# Patient Record
Sex: MALE | Race: WHITE | NOT HISPANIC OR LATINO | Employment: OTHER | URBAN - METROPOLITAN AREA
[De-identification: names, ages, dates, MRNs, and addresses within clinical notes are randomized per-mention and may not be internally consistent; named-entity substitution may affect disease eponyms.]

---

## 2017-06-01 ENCOUNTER — HISTORICAL (OUTPATIENT)
Dept: RADIOLOGY | Facility: HOSPITAL | Age: 82
End: 2017-06-01

## 2018-01-30 ENCOUNTER — HISTORICAL (OUTPATIENT)
Dept: LAB | Facility: HOSPITAL | Age: 83
End: 2018-01-30

## 2018-01-30 LAB
CREAT UR-MCNC: 211 MG/DL
MICROALBUMIN UR-MCNC: 11.5 MG/DL
MICROALBUMIN/CREAT RATIO PNL UR: 54.5 MG/GM CR (ref 0–30)

## 2018-11-12 ENCOUNTER — HISTORICAL (OUTPATIENT)
Dept: LAB | Facility: HOSPITAL | Age: 83
End: 2018-11-12

## 2018-11-12 LAB
ABS NEUT (OLG): 4.53 X10(3)/MCL (ref 2.1–9.2)
BASOPHILS # BLD AUTO: 0 X10(3)/MCL (ref 0–0.2)
BASOPHILS NFR BLD AUTO: 0 %
CRP SERPL HS-MCNC: 0.65 MG/L (ref 0–3)
EOSINOPHIL # BLD AUTO: 0.2 X10(3)/MCL (ref 0–0.9)
EOSINOPHIL NFR BLD AUTO: 4 %
ERYTHROCYTE [DISTWIDTH] IN BLOOD BY AUTOMATED COUNT: 14.4 % (ref 11.5–17)
ERYTHROCYTE [SEDIMENTATION RATE] IN BLOOD: 6 MM/HR (ref 0–15)
HCT VFR BLD AUTO: 44.3 % (ref 42–52)
HGB BLD-MCNC: 14.1 GM/DL (ref 14–18)
LYMPHOCYTES # BLD AUTO: 1.3 X10(3)/MCL (ref 0.6–4.6)
LYMPHOCYTES NFR BLD AUTO: 19 %
MCH RBC QN AUTO: 31.6 PG (ref 27–31)
MCHC RBC AUTO-ENTMCNC: 31.8 GM/DL (ref 33–36)
MCV RBC AUTO: 99.3 FL (ref 80–94)
MONOCYTES # BLD AUTO: 0.6 X10(3)/MCL (ref 0.1–1.3)
MONOCYTES NFR BLD AUTO: 10 %
NEUTROPHILS # BLD AUTO: 4.53 X10(3)/MCL (ref 2.1–9.2)
NEUTROPHILS NFR BLD AUTO: 67 %
PLATELET # BLD AUTO: 152 X10(3)/MCL (ref 130–400)
PMV BLD AUTO: 10.5 FL (ref 9.4–12.4)
RBC # BLD AUTO: 4.46 X10(6)/MCL (ref 4.7–6.1)
TSH SERPL-ACNC: 0.6 MIU/L (ref 0.36–3.74)
WBC # SPEC AUTO: 6.8 X10(3)/MCL (ref 4.5–11.5)

## 2019-02-05 ENCOUNTER — HISTORICAL (OUTPATIENT)
Dept: LAB | Facility: HOSPITAL | Age: 84
End: 2019-02-05

## 2019-02-05 LAB
CREAT UR-MCNC: 221 MG/DL
MICROALBUMIN UR-MCNC: 8.1 MG/DL
MICROALBUMIN/CREAT RATIO PNL UR: 36.7 MG/GM CR (ref 0–30)

## 2019-08-09 ENCOUNTER — HISTORICAL (OUTPATIENT)
Dept: ADMINISTRATIVE | Facility: HOSPITAL | Age: 84
End: 2019-08-09

## 2019-08-09 LAB
ALBUMIN SERPL-MCNC: 4.2 GM/DL (ref 3.4–5)
ALP SERPL-CCNC: 82 UNIT/L (ref 45–117)
ALT SERPL-CCNC: 19 UNIT/L (ref 16–61)
AST SERPL-CCNC: 21 UNIT/L (ref 15–37)
BILIRUB SERPL-MCNC: 3.2 MG/DL (ref 0.2–1)
BILIRUBIN DIRECT+TOT PNL SERPL-MCNC: 0.6 MG/DL (ref 0–0.2)
BILIRUBIN DIRECT+TOT PNL SERPL-MCNC: 2.6 MG/DL (ref 0–1)
BUN SERPL-MCNC: 24 MG/DL (ref 7–18)
CALCIUM SERPL-MCNC: 9.6 MG/DL (ref 8.5–10.1)
CHLORIDE SERPL-SCNC: 102 MMOL/L (ref 98–107)
CO2 SERPL-SCNC: 36 MMOL/L (ref 21–32)
CREAT SERPL-MCNC: 1.65 MG/DL (ref 0.7–1.3)
CREAT/UREA NIT SERPL: 15
GLUCOSE SERPL-MCNC: 104 MG/DL (ref 74–106)
POTASSIUM SERPL-SCNC: 4 MMOL/L (ref 3.5–5.1)
PROT SERPL-MCNC: 7.5 GM/DL (ref 6.4–8.2)
SODIUM SERPL-SCNC: 142 MMOL/L (ref 136–145)

## 2019-09-05 ENCOUNTER — HISTORICAL (OUTPATIENT)
Dept: LAB | Facility: HOSPITAL | Age: 84
End: 2019-09-05

## 2019-09-05 LAB
ABS NEUT (OLG): 3.47 X10(3)/MCL (ref 2.1–9.2)
BASOPHILS # BLD AUTO: 0 X10(3)/MCL (ref 0–0.2)
BASOPHILS NFR BLD AUTO: 0 %
EOSINOPHIL # BLD AUTO: 0.2 X10(3)/MCL (ref 0–0.9)
EOSINOPHIL NFR BLD AUTO: 4 %
ERYTHROCYTE [DISTWIDTH] IN BLOOD BY AUTOMATED COUNT: 14 % (ref 11.5–17)
HCT VFR BLD AUTO: 46.2 % (ref 42–52)
HGB BLD-MCNC: 14.9 GM/DL (ref 14–18)
LYMPHOCYTES # BLD AUTO: 1.3 X10(3)/MCL (ref 0.6–4.6)
LYMPHOCYTES NFR BLD AUTO: 22 %
MCH RBC QN AUTO: 31.1 PG (ref 27–31)
MCHC RBC AUTO-ENTMCNC: 32.3 GM/DL (ref 33–36)
MCV RBC AUTO: 96.5 FL (ref 80–94)
MONOCYTES # BLD AUTO: 0.6 X10(3)/MCL (ref 0.1–1.3)
MONOCYTES NFR BLD AUTO: 10 %
NEUTROPHILS # BLD AUTO: 3.47 X10(3)/MCL (ref 2.1–9.2)
NEUTROPHILS NFR BLD AUTO: 62 %
PLATELET # BLD AUTO: 151 X10(3)/MCL (ref 130–400)
PMV BLD AUTO: 10.9 FL (ref 9.4–12.4)
RBC # BLD AUTO: 4.79 X10(6)/MCL (ref 4.7–6.1)
WBC # SPEC AUTO: 5.6 X10(3)/MCL (ref 4.5–11.5)

## 2020-01-20 ENCOUNTER — HISTORICAL (OUTPATIENT)
Dept: ADMINISTRATIVE | Facility: HOSPITAL | Age: 85
End: 2020-01-20

## 2020-02-06 ENCOUNTER — HISTORICAL (OUTPATIENT)
Dept: LAB | Facility: HOSPITAL | Age: 85
End: 2020-02-06

## 2020-02-06 LAB
(HCYS)2 SERPL-MCNC: 18.4 MCMOL/L (ref 3.2–10.7)
ABS NEUT (OLG): 5.6 X10(3)/MCL (ref 2.1–9.2)
BASOPHILS # BLD AUTO: 0 X10(3)/MCL (ref 0–0.2)
BASOPHILS NFR BLD AUTO: 0 %
CRP SERPL HS-MCNC: 2.81 MG/L (ref 0–3)
EOSINOPHIL # BLD AUTO: 0.2 X10(3)/MCL (ref 0–0.9)
EOSINOPHIL NFR BLD AUTO: 2 %
ERYTHROCYTE [DISTWIDTH] IN BLOOD BY AUTOMATED COUNT: 13.9 % (ref 11.5–17)
HCT VFR BLD AUTO: 44.7 % (ref 42–52)
HGB BLD-MCNC: 14.6 GM/DL (ref 14–18)
LYMPHOCYTES # BLD AUTO: 1.7 X10(3)/MCL (ref 0.6–4.6)
LYMPHOCYTES NFR BLD AUTO: 20 %
MCH RBC QN AUTO: 31.6 PG (ref 27–31)
MCHC RBC AUTO-ENTMCNC: 32.7 GM/DL (ref 33–36)
MCV RBC AUTO: 96.8 FL (ref 80–94)
MONOCYTES # BLD AUTO: 0.8 X10(3)/MCL (ref 0.1–1.3)
MONOCYTES NFR BLD AUTO: 10 %
NEUTROPHILS # BLD AUTO: 5.6 X10(3)/MCL (ref 2.1–9.2)
NEUTROPHILS NFR BLD AUTO: 67 %
PLATELET # BLD AUTO: 252 X10(3)/MCL (ref 130–400)
PMV BLD AUTO: 10 FL (ref 9.4–12.4)
RBC # BLD AUTO: 4.62 X10(6)/MCL (ref 4.7–6.1)
WBC # SPEC AUTO: 8.4 X10(3)/MCL (ref 4.5–11.5)

## 2020-03-04 ENCOUNTER — HISTORICAL (OUTPATIENT)
Dept: LAB | Facility: HOSPITAL | Age: 85
End: 2020-03-04

## 2020-03-04 LAB
CREAT UR-MCNC: 281 MG/DL
MICROALBUMIN UR-MCNC: 10.8 MG/DL
MICROALBUMIN/CREAT RATIO PNL UR: 38.4 MG/GM CR (ref 0–30)

## 2020-07-13 ENCOUNTER — HISTORICAL (OUTPATIENT)
Dept: ADMINISTRATIVE | Facility: HOSPITAL | Age: 85
End: 2020-07-13

## 2020-07-13 LAB
ABS NEUT (OLG): 4.76 X10(3)/MCL (ref 2.1–9.2)
BASOPHILS # BLD AUTO: 0 X10(3)/MCL (ref 0–0.2)
BASOPHILS NFR BLD AUTO: 0 %
EOSINOPHIL # BLD AUTO: 0.3 X10(3)/MCL (ref 0–0.9)
EOSINOPHIL NFR BLD AUTO: 5 %
ERYTHROCYTE [DISTWIDTH] IN BLOOD BY AUTOMATED COUNT: 13.3 % (ref 11.5–17)
HCT VFR BLD AUTO: 41.9 % (ref 42–52)
HGB BLD-MCNC: 13.9 GM/DL (ref 14–18)
LYMPHOCYTES # BLD AUTO: 1.1 X10(3)/MCL (ref 0.6–4.6)
LYMPHOCYTES NFR BLD AUTO: 16 %
MCH RBC QN AUTO: 32.1 PG (ref 27–31)
MCHC RBC AUTO-ENTMCNC: 33.2 GM/DL (ref 33–36)
MCV RBC AUTO: 96.8 FL (ref 80–94)
MONOCYTES # BLD AUTO: 0.6 X10(3)/MCL (ref 0.1–1.3)
MONOCYTES NFR BLD AUTO: 9 %
NEUTROPHILS # BLD AUTO: 4.76 X10(3)/MCL (ref 2.1–9.2)
NEUTROPHILS NFR BLD AUTO: 69 %
PLATELET # BLD AUTO: 162 X10(3)/MCL (ref 130–400)
PMV BLD AUTO: 9.9 FL (ref 9.4–12.4)
RBC # BLD AUTO: 4.33 X10(6)/MCL (ref 4.7–6.1)
WBC # SPEC AUTO: 6.9 X10(3)/MCL (ref 4.5–11.5)

## 2020-12-30 ENCOUNTER — HISTORICAL (OUTPATIENT)
Dept: ENDOSCOPY | Facility: HOSPITAL | Age: 85
End: 2020-12-30

## 2021-11-28 ENCOUNTER — HISTORICAL (OUTPATIENT)
Dept: LAB | Facility: HOSPITAL | Age: 86
End: 2021-11-28

## 2021-11-28 LAB
BUN SERPL-MCNC: 19 MG/DL (ref 8.4–25.7)
CALCIUM SERPL-MCNC: 10.4 MG/DL (ref 8.8–10)
CHLORIDE SERPL-SCNC: 99 MMOL/L (ref 98–107)
CO2 SERPL-SCNC: 30 MMOL/L (ref 23–31)
CREAT SERPL-MCNC: 1.18 MG/DL (ref 0.72–1.25)
CREAT/UREA NIT SERPL: 16
GLUCOSE SERPL-MCNC: 200 MG/DL (ref 82–115)
POTASSIUM SERPL-SCNC: 4.5 MMOL/L (ref 3.5–5.1)
SODIUM SERPL-SCNC: 142 MMOL/L (ref 136–145)

## 2021-11-29 ENCOUNTER — HISTORICAL (OUTPATIENT)
Dept: LAB | Facility: HOSPITAL | Age: 86
End: 2021-11-29

## 2021-11-29 LAB
BUN SERPL-MCNC: 19.1 MG/DL (ref 8.4–25.7)
CALCIUM SERPL-MCNC: 9.8 MG/DL (ref 8.8–10)
CHLORIDE SERPL-SCNC: 102 MMOL/L (ref 98–107)
CO2 SERPL-SCNC: 30 MMOL/L (ref 23–31)
CREAT SERPL-MCNC: 1.14 MG/DL (ref 0.72–1.25)
CREAT/UREA NIT SERPL: 17
GLUCOSE SERPL-MCNC: 229 MG/DL (ref 82–115)
POTASSIUM SERPL-SCNC: 4.4 MMOL/L (ref 3.5–5.1)
SODIUM SERPL-SCNC: 142 MMOL/L (ref 136–145)

## 2022-04-10 ENCOUNTER — HISTORICAL (OUTPATIENT)
Dept: ADMINISTRATIVE | Facility: HOSPITAL | Age: 87
End: 2022-04-10

## 2022-04-26 VITALS
DIASTOLIC BLOOD PRESSURE: 81 MMHG | HEIGHT: 72 IN | OXYGEN SATURATION: 99 % | BODY MASS INDEX: 23.56 KG/M2 | SYSTOLIC BLOOD PRESSURE: 134 MMHG | WEIGHT: 173.94 LBS

## 2022-04-30 NOTE — OP NOTE
DATE OF SURGERY:        SURGEON:  Chauncey Waters MD    HISTORY OF PRESENT ILLNESS:  Mr. Bhat is an 87-year-old, known to me from previous evaluation.  He has a remote history of colitis and adenomatous colon polyps, and his last exam in that regard took place in February of 2013.     He has had problematic solid food dysphagia, without significant pyrosis, and in August of 2017, we found a tortuous esophagus with a wide-mouth diverticulum at 35 cm and a relative stenosis at the esophagogastric junction.  We used a TTS balloon to dilate to 18 mm, and he garnered significant improvement.  He re-presented in December of 2018 with similar concerns, and again underwent endoscopy with TTS dilation to 18 mm.  He did find relief.  I had proposed that should he have another need, we may consider a Savary dilation, hopeful for more long-standing benefit.     The patient has had progressive dementia.  He does take Eliquis for atrial fibrillation, but it has been held in recent days.     [facility], though I note in September of 2020, his only liver test abnormalities were a total bilirubin of 3.1 with a normal alkaline phosphatase of 60 and normal transaminases.  His concerns seemed related to Gilbert's.  He had undergone a CT of the abdomen and pelvis in October of 2020 that found chronic-appearing gallbladder wall thickening with adherent gallstones or polyps, but no ductal dilatation.  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     I had a lengthy discussion with the wife today, and given his years and progressive mental decline, she is not inclined to pursue some exhaustive evaluation.  I concur.    ALLERGIES:  None.    MEDICATIONS:  Listed as:  1. Apixaban.  2. Chlorthalidone.  3. Metformin.  4. Insulin.  5. Rosuvastatin.  6. Aspirin.  7. Carvedilol.    8. He has held the apixaban (Eliquis) in recent days.    PAST MEDICAL HISTORY:  Remarkable for diabetes  mellitus, hyperlipidemia, hypertension, remote history of myocardial infarction.  He apparently has had sleep apnea, cataract surgery, appendectomy, pacemaker placement, cholecystectomy, hemorrhoidectomy, prostate surgery.  He does have the Alzheimer's.    PHYSICAL EXAMINATION:  GENERAL:  He is alert, but verbalizes very little.     HEART:  Seems regular.   LUNGS:  Clear.   ABDOMEN:  Scaphoid, soft, and nontender, without organomegaly or mass.   NEUROLOGICAL:  Alert.  He is not clearly oriented.  He does move all four extremities.    DISCUSSION:  Elderly patient with recurrent dysphagia.  The patient will undergo endoscopy and dilation with further recommendations to follow.  Given his age and comorbidities, we will likely not pursue other investigation.        ______________________________  Chauncey Waters MD    JCB/UF  DD:  12/30/2020  Time:  10:13AM  DT:  12/30/2020  Time:  10:32AM  Job #:  [id number]    cc: MD Chauncey Licona MD Karen Smith, MD

## 2022-04-30 NOTE — OP NOTE
DATE OF SURGERY:    12/30/2020    SURGEON:  Chauncey Waters MD    OPERATION:  Esophagogastroduodenoscopy and wire dilation.    PREOPERATIVE DIAGNOSES:    1. Recurrent solid food dysphagia.  2. History of esophageal diverticula.    POSTOPERATIVE DIAGNOSES:    1. Adequate sedation with Diprivan per Anesthesia.  2. Very tortuous esophagus with a clear-cut wide-mouth diverticulum at the mid esophagus around 35 cm with some relative stenosis evident there and at the esophagogastric junction.  3. No evidence for hiatal hernia.  Otherwise, benign exam to the second portion of the duodenum other than for a very mild bulbar duodenitis as previously noted.  4. Status post Savary dilation initially with a 51-Albanian, followed by repeat endoscopy and then subsequently with a 54-Albanian noting mild resistance.    PROCEDURE IN DETAIL:  The patient consented for the procedure after a detailed explanation of the risks and complications, including bleeding and perforation, as well as adverse reaction to sedation.     The patient was placed in the left lateral decubitus position.  A video endoscope was inserted in the oropharynx and passed under direct vision.  The hypopharynx and larynx were briefly examined and the esophagus entered.  After evaluation of the upper, mid and lower esophagus, the scope was passed into the stomach and retroflexed.  The cardia and fundus were evaluated.  The scope was de-retroflexed and we evaluated the body and antrum.     We traversed the pylorus, evaluating the first and second portions of the duodenum.  The scope was withdrawn and biopsies and specimens obtained as outlined below.  The scope was removed in its entirety and the patient tolerated the procedure well.     The patient's findings are as detailed.  He did have a tortuous esophagus with clear-cut wide-mouth diverticula in the mid region.  There was some questionable stenosis at various levels including at the esophagogastric  junction.  There was no visible hiatal hernia.  He had mild bulbar duodenitis, but an otherwise reassuring exam to the second portion.     We inserted a Savary wire, maintained it in position and dilated him with a 51-French Savary.  I then reinserted the scope to ensure no mucosal injury.  We subsequently reinserted a Savary wire and then dilated him with a 54-French Savary, again with mild resistance.    DISCUSSION:  After his procedure was completed, we discussed our findings with the patient and his wife.  I am hopeful that today's dilation brings him relief for the long term.  Again, I am not inclined to subject him to more in the way of investigation regarding his pancreatic cyst and the apparent Gilbert's syndrome.     We will be available on an as-needed basis down the line.  We will let him resume his anticoagulation.        ______________________________  MD TAMRA Ruiz/SC  DD:  12/30/2020  Time:  10:30AM  DT:  12/30/2020  Time:  10:41AM  Job #:  [id number]    cc: MD Dr. Shannan Ruiz Dr.

## 2022-09-22 ENCOUNTER — LAB REQUISITION (OUTPATIENT)
Dept: LAB | Facility: HOSPITAL | Age: 87
End: 2022-09-22
Payer: MEDICARE

## 2022-09-22 DIAGNOSIS — R32 UNSPECIFIED URINARY INCONTINENCE: ICD-10-CM

## 2022-09-22 DIAGNOSIS — N39.0 URINARY TRACT INFECTION, SITE NOT SPECIFIED: ICD-10-CM

## 2022-09-22 LAB
APPEARANCE UR: ABNORMAL
BACTERIA #/AREA URNS AUTO: ABNORMAL /HPF
BILIRUB UR QL STRIP.AUTO: NEGATIVE MG/DL
COLOR UR AUTO: ABNORMAL
GLUCOSE UR QL STRIP.AUTO: 3 MG/DL
KETONES UR QL STRIP.AUTO: NEGATIVE MG/DL
LEUKOCYTE ESTERASE UR QL STRIP.AUTO: 2 UNIT/L
NITRITE UR QL STRIP.AUTO: NEGATIVE
PH UR STRIP.AUTO: 6 [PH]
PROT UR QL STRIP.AUTO: 1 MG/DL
RBC #/AREA URNS AUTO: ABNORMAL /HPF
RBC UR QL AUTO: 2 UNIT/L
SP GR UR STRIP.AUTO: <1.005 (ref 1–1.03)
SQUAMOUS #/AREA URNS AUTO: ABNORMAL /HPF
UROBILINOGEN UR STRIP-ACNC: 1 MG/DL
WBC #/AREA URNS AUTO: >100 /HPF

## 2022-09-22 PROCEDURE — 87077 CULTURE AEROBIC IDENTIFY: CPT | Performed by: INTERNAL MEDICINE

## 2022-09-22 PROCEDURE — 81001 URINALYSIS AUTO W/SCOPE: CPT | Performed by: INTERNAL MEDICINE

## 2022-09-24 LAB — BACTERIA UR CULT: ABNORMAL

## 2022-10-25 ENCOUNTER — HOSPITAL ENCOUNTER (EMERGENCY)
Facility: HOSPITAL | Age: 87
Discharge: HOME | End: 2022-10-26
Attending: EMERGENCY MEDICINE
Payer: MEDICARE

## 2022-10-25 DIAGNOSIS — T14.90XA TRAUMA: ICD-10-CM

## 2022-10-25 DIAGNOSIS — S01.01XA LACERATION OF SCALP, INITIAL ENCOUNTER: ICD-10-CM

## 2022-10-25 DIAGNOSIS — I10 BENIGN ESSENTIAL HYPERTENSION: ICD-10-CM

## 2022-10-25 DIAGNOSIS — Z79.01 CHRONIC ANTICOAGULATION: ICD-10-CM
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
## 2022-10-25 PROCEDURE — G0390 TRAUMA RESPONS W/HOSP CRITI: HCPCS

## 2022-10-25 PROCEDURE — 99285 EMERGENCY DEPT VISIT HI MDM: CPT | Mod: 25

## 2022-10-26 VITALS
DIASTOLIC BLOOD PRESSURE: 71 MMHG | BODY MASS INDEX: 20.99 KG/M2 | RESPIRATION RATE: 16 BRPM | SYSTOLIC BLOOD PRESSURE: 144 MMHG | TEMPERATURE: 99 F | WEIGHT: 155 LBS | OXYGEN SATURATION: 100 % | HEIGHT: 72 IN | HEART RATE: 60 BPM

## 2022-10-26 PROCEDURE — 25000003 PHARM REV CODE 250: Performed by: EMERGENCY MEDICINE

## 2022-10-26 PROCEDURE — 12002 RPR S/N/AX/GEN/TRNK2.6-7.5CM: CPT

## 2022-10-26 PROCEDURE — 36000 PLACE NEEDLE IN VEIN: CPT

## 2022-10-26 PROCEDURE — 63600175 PHARM REV CODE 636 W HCPCS: Performed by: EMERGENCY MEDICINE

## 2022-10-26 PROCEDURE — 90471 IMMUNIZATION ADMIN: CPT | Performed by: EMERGENCY MEDICINE

## 2022-10-26 PROCEDURE — 90715 TDAP VACCINE 7 YRS/> IM: CPT | Performed by: EMERGENCY MEDICINE

## 2022-10-26 RX ORDER — ACETAMINOPHEN 500 MG
1000 TABLET ORAL
Status: COMPLETED | OUTPATIENT
Start: 2022-10-26 | End: 2022-10-26

## 2022-10-26 RX ADMIN — ACETAMINOPHEN 1000 MG: 500 TABLET ORAL at 01:10

## 2022-10-26 RX ADMIN — TETANUS TOXOID, REDUCED DIPHTHERIA TOXOID AND ACELLULAR PERTUSSIS VACCINE, ADSORBED 0.5 ML: 5; 2.5; 8; 8; 2.5 SUSPENSION INTRAMUSCULAR at 12:10

## 2022-10-26 NOTE — DISCHARGE INSTRUCTIONS
You will need your staples removed in about 5 days. This can be done at a walk in clinic, your primary care provider, or any ER. Keep the wound clean and dry for the first 24 hours. After the first 24 hours you can clean it with soap and water or shampoo. Do not use hydrogen peroxide

## 2022-10-26 NOTE — ED PROVIDER NOTES
Encounter Date: 10/25/2022    SCRIBE #1 NOTE: I, Eugenio Terrell, am scribing for, and in the presence of,  Heather Hua MD. I have scribed the following portions of the note - Other sections scribed: HPI, ROS, PE.     History   No chief complaint on file.    89 year old male with past medical history of HTN, DM, and dementia presents to ED via EMS for witnessed fall at home just prior to arrival. Per EMS pt was getting out of bed and fell, hitting his head on a nightstand. EMS denies LOC. EMS reports that caregivers state that pt was at his mental baseline following fall. EMS reports pt is on 2.5 of eliquis. Pt reports that his head hurts.    The history is provided by the EMS personnel. No  was used.   Fall  The accident occurred just prior to arrival. The fall occurred from a bed (from ground level). He fell from a height of 1 to 2 ft. He landed on A hard floor (nightstand). The point of impact was the head. The pain is present in the head. Associated symptoms include headaches. Pertinent negatives include no fever, no numbness, no abdominal pain and no loss of consciousness.   Review of patient's allergies indicates:  Not on File  Past Medical History:   Diagnosis Date    Dementia     Type 2 diabetes mellitus without complications      History reviewed. No pertinent surgical history.  History reviewed. No pertinent family history.  Social History     Tobacco Use    Smoking status: Never    Smokeless tobacco: Never   Substance Use Topics    Alcohol use: Never    Drug use: Never     Review of Systems   Constitutional:  Negative for activity change, diaphoresis, fatigue and fever.   HENT:  Negative for congestion, postnasal drip, rhinorrhea, sinus pain, sneezing and sore throat.         Head pain   Respiratory:  Negative for cough, chest tightness, shortness of breath and wheezing.    Cardiovascular:  Negative for chest pain, palpitations and leg swelling.   Gastrointestinal:  Negative  for abdominal distention, abdominal pain and blood in stool.   Genitourinary:  Negative for decreased urine volume, difficulty urinating and dysuria.   Musculoskeletal: Negative.    Skin:  Positive for wound. Negative for color change and pallor.   Neurological:  Positive for headaches. Negative for dizziness, loss of consciousness, speech difficulty, weakness, light-headedness and numbness.   All other systems reviewed and are negative.    Physical Exam     Initial Vitals   BP Pulse Resp Temp SpO2   10/26/22 0014 10/26/22 0014 10/26/22 0014 10/26/22 0014 10/25/22 2358   (!) 132/91 60 12 98.4 °F (36.9 °C) 96 %      MAP       --                Physical Exam    Nursing note and vitals reviewed.  Constitutional: He appears well-developed and well-nourished. He is not diaphoretic. No distress.   HENT:   Head: Normocephalic.   Nose: Nose normal.   Mouth/Throat: Oropharynx is clear and moist.   Eyes: Conjunctivae and EOM are normal. Pupils are equal, round, and reactive to light.   Pupils 3 mm, reactive   Neck: Trachea normal. Neck supple.   Normal range of motion.  Cardiovascular:  Normal rate, regular rhythm, normal heart sounds and intact distal pulses.     Exam reveals no gallop and no friction rub.       No murmur heard.  Pulses:       Radial pulses are 2+ on the right side and 2+ on the left side.        Dorsalis pedis pulses are 2+ on the right side and 2+ on the left side.   Pulmonary/Chest: Breath sounds normal. No respiratory distress. He has no wheezes. He has no rhonchi. He has no rales. He exhibits no tenderness.   Bilateral breath sounds   Abdominal: Abdomen is soft. Bowel sounds are normal. He exhibits no distension and no mass. There is no abdominal tenderness. There is no rebound.   Musculoskeletal:         General: No tenderness or edema. Normal range of motion.      Cervical back: Normal range of motion and neck supple. No tenderness.      Thoracic back: No tenderness.      Lumbar back: Normal. No  tenderness. Normal range of motion.      Comments: No step offs to spine     Neurological: He is alert. He has normal strength. No cranial nerve deficit or sensory deficit. GCS eye subscore is 4. GCS verbal subscore is 4. GCS motor subscore is 6.   GCS 14   Skin: Skin is warm and dry. Capillary refill takes less than 2 seconds. Abrasion (right elbow) and laceration (right frontal scalp) noted. No rash and no abscess noted. No erythema. No pallor.   3 cm frontal scalp lac   Psychiatric: He has a normal mood and affect. His behavior is normal. Judgment and thought content normal.       ED Course   Lac Repair    Date/Time: 10/26/2022 1:10 AM  Performed by: Heather Hua MD  Authorized by: Heahter Hua MD     Consent:     Consent obtained:  Verbal    Consent given by:  Patient    Risks discussed:  Pain, poor cosmetic result, poor wound healing, need for additional repair and infection    Alternatives discussed:  No treatment and delayed treatment  Universal protocol:     Procedure explained and questions answered to patient or proxy's satisfaction: yes      Patient identity confirmed:  Arm band  Anesthesia:     Anesthesia method:  Local infiltration    Local anesthetic:  Lidocaine 1% WITH epi  Laceration details:     Location:  Scalp    Scalp location:  Frontal    Length (cm):  3    Depth (mm):  1  Pre-procedure details:     Preparation:  Imaging obtained to evaluate for foreign bodies  Exploration:     Limited defect created (wound extended): no      Hemostasis achieved with:  Epinephrine    Imaging outcome: foreign body not noted      Wound exploration: wound explored through full range of motion and entire depth of wound visualized      Wound extent: no areolar tissue violation noted, no fascia violation noted, no foreign bodies/material noted, no muscle damage noted, no nerve damage noted, no tendon damage noted, no underlying fracture noted and no vascular damage noted      Contaminated: no    Treatment:      Amount of cleaning:  Extensive    Irrigation solution:  Sterile saline    Irrigation method:  Pressure wash    Visualized foreign bodies/material removed: no      Debridement:  None    Undermining:  None    Scar revision: no    Skin repair:     Repair method:  Staples    Number of staples:  7  Approximation:     Approximation:  Close  Repair type:     Repair type:  Simple  Post-procedure details:     Dressing:  Open (no dressing)    Procedure completion:  Tolerated well, no immediate complications  Labs Reviewed - No data to display       Imaging Results              X-Ray Pelvis Routine AP (Preliminary result)  Result time 10/26/22 00:51:33      ED Interpretation by Heather Hua MD (10/26/22 00:51:33, [facility] - Emergency Dept, Emergency Medicine)    No acute fracture or dislocation noted                                     X-Ray Chest AP Portable (Preliminary result)  Result time 10/26/22 00:52:07      ED Interpretation by Heather Hua MD (10/26/22 00:52:07, [facility] - Emergency Dept, Emergency Medicine)    Aicd in place, no acute cardiopulmonary abnormality noted                                     CT Cervical Spine Without Contrast (Preliminary result)  Result time 10/26/22 00:30:34      Preliminary result by Vic June Jr., MD (10/26/22 00:30:34)                   Narrative:    START OF REPORT:  Technique: CT of the cervical spine was performed without intravenous contrast with axial as well as sagittal and coronal images.    Comparison: None.    Dosage Information: Automated exposure control was utilized.    Clinical history: Fall, lac rt parietal.    Findings:  Position: Supine.  Artifact: None.  Lung apices: The visualized lung apices appear unremarkable.  Spine:  Spinal canal: The spinal canal appears unremarkable.  Spinal cord: The spinal cord appears unremarkable.  Mineralization: Within normal limits.  Rotation: No significant rotation is  seen.  Scoliosis: No significant scoliosis is seen.  Vertebral Fusion: No vertebral fusion is identified.  Listhesis: No significant listhesis is identified.  Lordosis: The cervical lordosis is maintained.  Intervertebral disc spaces: Multilevel loss of disc height is seen.  Osteophytes: Pronounced multilevel endplate osteophytes are seen. Large anterior bridging osteophytes are seen.  Endplate Sclerosis: Moderate multilevel endplate sclerosis is seen.  Uncovertebral degenerative changes: Moderate multilevel uncovertebral joint arthrosis is seen.  Facet degenerative changes: Mild multilevel facet degenerative changes are seen.  Calcifications: None.  Fractures: No acute cervical spine fracture dislocation or subluxation is seen.  Orthopedic Hardware: None.    Miscellaneous:  Mastoid air cells: The visualized mastoid air cells appear clear.  Soft Tissues: Unremarkable.      Impression:  1. No acute cervical spine fracture dislocation or subluxation is seen.  2. Degenerative changes and other details as above. Details and other findings as noted above.                          Preliminary result by Interface, Rad Results In (10/26/22 00:30:34)                   Narrative:    START OF REPORT:  Technique: CT of the cervical spine was performed without intravenous contrast with axial as well as sagittal and coronal images.    Comparison: None.    Dosage Information: Automated exposure control was utilized.    Clinical history: Fall, lac rt parietal.    Findings:  Position: Supine.  Artifact: None.  Lung apices: The visualized lung apices appear unremarkable.  Spine:  Spinal canal: The spinal canal appears unremarkable.  Spinal cord: The spinal cord appears unremarkable.  Mineralization: Within normal limits.  Rotation: No significant rotation is seen.  Scoliosis: No significant scoliosis is seen.  Vertebral Fusion: No vertebral fusion is identified.  Listhesis: No significant listhesis is identified.  Lordosis: The  cervical lordosis is maintained.  Intervertebral disc spaces: Multilevel loss of disc height is seen.  Osteophytes: Pronounced multilevel endplate osteophytes are seen. Large anterior bridging osteophytes are seen.  Endplate Sclerosis: Moderate multilevel endplate sclerosis is seen.  Uncovertebral degenerative changes: Moderate multilevel uncovertebral joint arthrosis is seen.  Facet degenerative changes: Mild multilevel facet degenerative changes are seen.  Calcifications: None.  Fractures: No acute cervical spine fracture dislocation or subluxation is seen.  Orthopedic Hardware: None.    Miscellaneous:  Mastoid air cells: The visualized mastoid air cells appear clear.  Soft Tissues: Unremarkable.      Impression:  1. No acute cervical spine fracture dislocation or subluxation is seen.  2. Degenerative changes and other details as above. Details and other findings as noted above.                                         CT Head Without Contrast (Preliminary result)  Result time 10/26/22 00:29:52      Preliminary result by Vic June Jr., MD (10/26/22 00:29:52)                   Narrative:    START OF REPORT:  Technique: CT of the head was performed without intravenous contrast with axial as well as coronal and sagittal images.    Comparison: None.    Dosage Information: Automated exposure control was utilized.    Clinical history: Fall, lac rt parietal.    Findings:  Hemorrhage: No acute intracranial hemorrhage is seen.  CSF spaces: The ventricles, sulci and basal cisterns all appear moderately prominent consistent with global cerebral atrophy.  Brain parenchyma: There is preservation of the grey white junction throughout. No acute infarct is identified. Moderate microvascular change is seen in portions of the periventricular and deep white matter tracts.  Cerebellum: Unremarkable.  Vascular: Unremarkable venous sinuses.  Sella and skull base: The sella appears to be within normal limits for  age.  Herniation: None.  Intracranial calcifications: Incidental note is made of bilateral choroid plexus calcification. Incidental note is made of some pineal region calcification.  Calvarium: No acute linear or depressed skull fracture is seen.    Maxillofacial Structures:  Paranasal sinuses: There are mucosal polyps seen in bilateral maxillary sinuses. Rest of the sinuses appears unremarkable.  Orbits: The orbits appear unremarkable.  Zygomatic arches: The zygomatic arches are intact and unremarkable.  Temporal bones and mastoids: The temporal bones and mastoids appear unremarkable.  TMJ: The mandibular condyles appear normally placed with respect to the mandibular fossa.  Nasal Bones: The nasal septum is midline.      Impression:  1. No acute intracranial process identified. Details and other findings as noted above.                          Preliminary result by Interface, Rad Results In (10/26/22 00:29:52)                   Narrative:    START OF REPORT:  Technique: CT of the head was performed without intravenous contrast with axial as well as coronal and sagittal images.    Comparison: None.    Dosage Information: Automated exposure control was utilized.    Clinical history: Fall, lac rt parietal.    Findings:  Hemorrhage: No acute intracranial hemorrhage is seen.  CSF spaces: The ventricles, sulci and basal cisterns all appear moderately prominent consistent with global cerebral atrophy.  Brain parenchyma: There is preservation of the grey white junction throughout. No acute infarct is identified. Moderate microvascular change is seen in portions of the periventricular and deep white matter tracts.  Cerebellum: Unremarkable.  Vascular: Unremarkable venous sinuses.  Sella and skull base: The sella appears to be within normal limits for age.  Herniation: None.  Intracranial calcifications: Incidental note is made of bilateral choroid plexus calcification. Incidental note is made of some pineal region  calcification.  Calvarium: No acute linear or depressed skull fracture is seen.    Maxillofacial Structures:  Paranasal sinuses: There are mucosal polyps seen in bilateral maxillary sinuses. Rest of the sinuses appears unremarkable.  Orbits: The orbits appear unremarkable.  Zygomatic arches: The zygomatic arches are intact and unremarkable.  Temporal bones and mastoids: The temporal bones and mastoids appear unremarkable.  TMJ: The mandibular condyles appear normally placed with respect to the mandibular fossa.  Nasal Bones: The nasal septum is midline.      Impression:  1. No acute intracranial process identified. Details and other findings as noted above.                                         Medications   Tdap (BOOSTRIX) vaccine injection 0.5 mL (0.5 mLs Intramuscular Given 10/26/22 0018)   acetaminophen tablet 1,000 mg (1,000 mg Oral Given 10/26/22 0113)     Medical Decision Making:   History:   I obtained history from: EMS provider.       <> Summary of History: Witnessed slip and fall with head injury without loc  Old Medical Records: I decided to obtain old medical records.  Old Records Summarized: records from clinic visits.       <> Summary of Records: Previous visits with generalized muscle weakness and dementia  Initial Assessment:   Level 2 trauma, head injury on eliquis  Differential Diagnosis:   Ich, c spine fracture, skull fracture, concussion, scalp lac  Clinical Tests:   Radiological Study: Ordered and Reviewed  ED Management:  Imaging negative, tetanus updated, lac repaired, c spine cleared  He is at baseline mental status and he and family are comfortable with dc        Scribe Attestation:   Scribe #1: I performed the above scribed service and the documentation accurately describes the services I performed. I attest to the accuracy of the note.  Comments: Attending:   Physician Attestation Statement for Scribe #1: I, Heather Hua MD, personally performed the services described in this  documentation. All medical record entries made by the scribe were at my direction and in my presence.  I have reviewed the chart and agree that the record reflects my personal performance and is accurate and complete.        Attending Attestation:           Physician Attestation for Scribe:  Physician Attestation Statement for Scribe #1: I, reviewed documentation, as scribed by Eugenio Terrell in my presence, and it is both accurate and complete.           ED Course as of 10/26/22 0304   Wed Oct 26, 2022   0052 Collar cleared by [facility]EX[facility] after negative imaging, no midline tenderness, no pain with rotation, flexion or extension [BS]   0215 Laceration has been repaired and patient has been resting comfortably. We are currently awaiting his name to be changed and family to come back to the room to update him. This is limited by movement restriction at this time [BS]   0301 I have updated son-in-law at bedside [BS]      ED Course User Index  [BS] Heather Hua MD                   Clinical Impression:   Final diagnoses:  [T14.90XA] Trauma  [W01.190A] Fall on same level from slipping, tripping and stumbling with subsequent striking against furniture, initial encounter (Primary)  [I10] Benign essential hypertension  [Z79.01] Chronic anticoagulation  [S01.01XA] Laceration of scalp, initial encounter        ED Disposition Condition    Discharge Stable          ED Prescriptions    None       Follow-up Information       Follow up With Specialties Details Why Contact Info    [facility] - Emergency Dept Emergency Medicine  As needed, If symptoms worsen, For suture removal [address] [id number] [phone]    Zuleima Fish MD Internal Medicine Schedule an appointment as soon as possible for a visit   [address]  [address]  [place] [address]  [phone]               Heather Hua MD  10/26/22 2131

## 2022-11-07 ENCOUNTER — OFFICE VISIT (OUTPATIENT)
Dept: URGENT CARE | Facility: CLINIC | Age: 87
End: 2022-11-07
Payer: MEDICARE

## 2022-11-07 VITALS
DIASTOLIC BLOOD PRESSURE: 71 MMHG | RESPIRATION RATE: 15 BRPM | BODY MASS INDEX: 19.37 KG/M2 | HEIGHT: 72 IN | WEIGHT: 143 LBS | HEART RATE: 61 BPM | SYSTOLIC BLOOD PRESSURE: 149 MMHG | OXYGEN SATURATION: 100 % | TEMPERATURE: 98 F

## 2022-11-07 DIAGNOSIS — Z48.02 ENCOUNTER FOR STAPLE REMOVAL: Primary | ICD-10-CM

## 2022-11-07 PROCEDURE — 99213 PR OFFICE/OUTPT VISIT, EST, LEVL III, 20-29 MIN: ICD-10-PCS | Mod: ,,, | Performed by: FAMILY MEDICINE

## 2022-11-07 PROCEDURE — 99213 OFFICE O/P EST LOW 20 MIN: CPT | Mod: ,,, | Performed by: FAMILY MEDICINE

## 2022-11-07 RX ORDER — CARVEDILOL 25 MG/1
TABLET ORAL
COMMUNITY

## 2022-11-07 RX ORDER — TAMSULOSIN HYDROCHLORIDE 0.4 MG/1
CAPSULE ORAL
COMMUNITY

## 2022-11-07 RX ORDER — ASPIRIN 81 MG/1
TABLET ORAL
COMMUNITY

## 2022-11-07 RX ORDER — METFORMIN HYDROCHLORIDE 500 MG/1
TABLET ORAL
COMMUNITY

## 2022-11-07 RX ORDER — AMOXICILLIN 250 MG
CAPSULE ORAL
COMMUNITY

## 2022-11-07 RX ORDER — CHOLECALCIFEROL (VITAMIN D3) 25 MCG
TABLET ORAL
COMMUNITY

## 2022-11-07 NOTE — PROGRESS NOTES
Subjective:       Patient ID: Nicolas Bhat is a 89 y.o. male.    Vitals:  height is 6' (1.829 m) and weight is 64.9 kg (143 lb). His temperature is 97.9 °F (36.6 °C). His blood pressure is 149/71 (abnormal) and his pulse is 61. His respiration is 15 and oxygen saturation is 100%.     Chief Complaint: Suture / Staple Removal (Remove staples from a fall last Tuesday on forehead)    HPI:  89-year-old male brought in by son for 2 intact staple removal.  Had laceration repair on the scalp last week.  States home nurse tried to remove the staples, total 6 staples placed, 4 removed easily.  To appears embedded.  Son states 6 attempts in the past to remove was not successful.  History of fall prior to the symptoms.  Had laceration repair in the ER    ROS  :  Constitutional : No fever, no fatigue  Neck : Negative except HPI  Respiratory : No shortness of breath, no wheezing  Cardiovascular : No chest pain  Musculoskeletal : Negative except HPI  Integumentary : No rash, no abnormal lesion  Neurological : Negative for tingling numbness and weakness   Objective:      Physical Exam    General : Alert and Oriented, No apparent distress, afebrile, appears comfortable sitting in the exam chair.  Neck - supple  HENT :  Scalp frontally on right side intact laceration repair with 2 staples appear embedded.  Remaining for staples was removed  Respiratory : Bilateral equal breath sounds, nonlabored respirations  Cardiovascular : Rate, rhythm regular, normal volume pulse, no murmur  Integumentary : Warm, Dry and no rash    Informed verbal consent by patient and son:  To remove the embedded staples.  Clean the area with alcohol swab, 1% lidocaine injected locally  Using toenail removal clipped on the top tented staple in 2 pieces.  Then it was removed easily using the forceps.  Appeared intact.  Topical antibiotic cream  Patient tolerated well  Assessment:       1. Encounter for staple removal          Plan:     Embedded 2 staples  removed from the scalp today.  Tylenol for pain and discomfort.  Keep the area dry and clean.    Call or return to clinic for any questions.  Follow-up with primary MD    Encounter for staple removal

## 2022-11-07 NOTE — PATIENT INSTRUCTIONS
Embedded 2 staples removed from the scalp today.  Tylenol for pain and discomfort.  Keep the area dry and clean.    Call or return to clinic for any questions.  Follow-up with primary MD